# Patient Record
Sex: MALE | Race: WHITE | Employment: STUDENT | URBAN - METROPOLITAN AREA
[De-identification: names, ages, dates, MRNs, and addresses within clinical notes are randomized per-mention and may not be internally consistent; named-entity substitution may affect disease eponyms.]

---

## 2020-04-21 ENCOUNTER — THERAPY VISIT (OUTPATIENT)
Dept: PHYSICAL THERAPY | Facility: CLINIC | Age: 20
End: 2020-04-21
Payer: COMMERCIAL

## 2020-04-21 DIAGNOSIS — Z47.89 UNSPECIFIED ORTHOPEDIC AFTERCARE: ICD-10-CM

## 2020-04-21 DIAGNOSIS — S43.005A SHOULDER DISLOCATION, LEFT, INITIAL ENCOUNTER: ICD-10-CM

## 2020-04-21 PROCEDURE — 97110 THERAPEUTIC EXERCISES: CPT | Mod: GP | Performed by: PHYSICAL THERAPIST

## 2020-04-21 PROCEDURE — 97161 PT EVAL LOW COMPLEX 20 MIN: CPT | Mod: GP | Performed by: PHYSICAL THERAPIST

## 2020-04-21 NOTE — PROGRESS NOTES
[facility] Initial Evaluation  Subjective:  The history is provided by the patient. No  was used.   Therapist Generated HPI Evaluation  Problem details: I started to have a left shoulder subluxation every since 2016.  The last dislocation 10/2019.  I had left shoulder Bankart on 3/16/2020.  continue to wear the sling. No specific complaints of pain   .         Type of problem:  Left shoulder.    This is a new condition.  Condition occurred with:  A fall (before surgery).  Where condition occurred: during recreation/sport (before surgery).  Patient reports pain:  Anterior and lateral.  Pain quality: tightness. and is intermittent.  Pain radiates to:  Shoulder and upper arm. Pain is the same all the time.  Since onset symptoms are gradually improving.  Associated with: none. Exacerbated by: not using the arm now.    Imaging testing: none.  Past treatment: before the other side.   Work activity restrictions: school, from home, computer.  Home/work barriers: house.    Patient Health History  Harry San being seen for left shoulder surgery.     Problem began: 3/16/2020.   Problem occurred: dislocations   Pain is reported as 0/10 on pain scale.  General health as reported by patient is excellent.  Pertinent medical history includes: none.   Red flags:  None as reported by patient.     Surgeries include:  Orthopedic surgery. Other surgery history details: right shoulder surgery--Bankart.    Current medications:  None.    Current occupation is student.   Primary job tasks include:  Computer work and prolonged sitting.                                    Objective:  Standing Alignment:    Cervical/Thoracic:  Normal (good sitting posture)                    Flexibility/Screens:   Positive screens:  Shoulder                             Shoulder Evaluation:  ROM:    PROM:  not assessed                            Pain: AAROM flexion 125, scaption 120    Strength:  not  assessed                      Stability Testing:  not assessed      Special Tests:  not assessed                                           General     ROS    Assessment/Plan:    Patient is a 19 year old male with left side shoulder complaints.    Patient has the following significant findings with corresponding treatment plan.                Diagnosis 1:  Left bankart shoulder surgery 3/16/2020  Decreased ROM/flexibility - manual therapy, therapeutic exercise, therapeutic activity and home program  Impaired muscle performance - neuro re-education and home program  Decreased function - therapeutic activities and home program  Evaluation ongoing with protocol    Therapy Evaluation Codes:   Cumulative Therapy Evaluation is: Low complexity.    Previous and current functional limitations:  (See Goal Flow Sheet for this information)    Short term and Long term goals: (See Goal Flow Sheet for this information)     Communication ability:  Patient appears to be able to clearly communicate and understand verbal and written communication and follow directions correctly.  Treatment Explanation - The following has been discussed with the patient:   RX ordered/plan of care  This patient would benefit from PT intervention to resume normal activities.   Rehab potential is excellent.    Frequency:  1 X week, once daily  Duration:  Every other week for 16 weeks   Discharge Plan:  Achieve all LTG.  Independent in home treatment program.    Please refer to the daily flowsheet for treatment today, total treatment time and time spent performing 1:1 timed codes.

## 2020-04-21 NOTE — LETTER
[facility] PHYSICAL THERAPY  [address]  [place] [address]  [phone]    2020    Re: Harry San   :   2000  MRN:  [id number]   REFERRING PHYSICIAN:   Tim Badillo    [facility] PHYSICAL THERAPY  Date of Initial Evaluation:  2020  Visits:   1  Reason for Referral:     Shoulder dislocation, left, initial encounter  Unspecified orthopedic aftercare    [facility] Initial Evaluation  Subjective:  The history is provided by the patient. No  was used.   Therapist Generated HPI Evaluation  Problem details: I started to have a left shoulder subluxation every since .  The last dislocation 10/2019.  I had left shoulder Bankart on 3/16/2020.  continue to wear the sling. No specific complaints of pain   .         Type of problem:  Left shoulder.  This is a new condition.  Condition occurred with:  A fall (before surgery).  Where condition occurred: during recreation/sport (before surgery).  Patient reports pain:  Anterior and lateral.  Pain quality: tightness. and is intermittent.  Pain radiates to:  Shoulder and upper arm. Pain is the same all the time.  Since onset symptoms are gradually improving.  Associated with: none. Exacerbated by: not using the arm now.    Imaging testing: none.  Past treatment: before the other side.   Work activity restrictions: school, from home, computer.  Home/work barriers: house.    Patient Health History  Harry San being seen for left shoulder surgery.   Problem began: 3/16/2020.   Problem occurred: dislocations   Pain is reported as 0/10 on pain scale.  General health as reported by patient is excellent.  Pertinent medical history includes: none.   Red flags:  None as reported by patient.  Surgeries include:  Orthopedic surgery. Other surgery history details: right shoulder surgery--Bankart.    Current  medications:  None.    Current occupation is student.   Primary job tasks include:  Computer work and prolonged sitting.   Re: Harry San   :   2000             Objective:  Standing Alignment:    Cervical/Thoracic:  Normal (good sitting posture)  Flexibility/Screens:   Positive screens:  Shoulder  Shoulder Evaluation:  ROM:  PROM:  not assessed  Pain: AAROM flexion 125, scaption 120  Strength:  not assessed  Stability Testing:  not assessed  Special Tests:  not assessed    Assessment/Plan:    Patient is a 19 year old male with left side shoulder complaints.    Patient has the following significant findings with corresponding treatment plan.                Diagnosis 1:  Left bankart shoulder surgery 3/16/2020  Decreased ROM/flexibility - manual therapy, therapeutic exercise, therapeutic activity and home program  Impaired muscle performance - neuro re-education and home program  Decreased function - therapeutic activities and home program  Evaluation ongoing with protocol    Therapy Evaluation Codes:   Cumulative Therapy Evaluation is: Low complexity.  Previous and current functional limitations:  (See Goal Flow Sheet for this information)    Short term and Long term goals: (See Goal Flow Sheet for this information)   Communication ability:  Patient appears to be able to clearly communicate and understand verbal and written communication and follow directions correctly.  Treatment Explanation - The following has been discussed with the patient:   RX ordered/plan of care    This patient would benefit from PT intervention to resume normal activities.   Rehab potential is excellent.  Frequency:  1 X week, once daily  Duration:  Every other week for 16 weeks   Discharge Plan:  Achieve all LTG.  Independent in home treatment program.    Thank you for your referral.    INQUIRIES        Therapist:   Parvin Irvin, PT   [facility]  [address]  [place] [address]  Phone: [phone]  Fax: [phone]

## 2020-05-07 ENCOUNTER — VIRTUAL VISIT (OUTPATIENT)
Dept: PHYSICAL THERAPY | Facility: CLINIC | Age: 20
End: 2020-05-07
Payer: COMMERCIAL

## 2020-05-07 DIAGNOSIS — S43.005A SHOULDER DISLOCATION, LEFT, INITIAL ENCOUNTER: ICD-10-CM

## 2020-05-07 DIAGNOSIS — Z47.89 UNSPECIFIED ORTHOPEDIC AFTERCARE: ICD-10-CM

## 2020-05-07 PROCEDURE — 97112 NEUROMUSCULAR REEDUCATION: CPT | Mod: 95 | Performed by: PHYSICAL THERAPIST

## 2020-05-07 PROCEDURE — 97110 THERAPEUTIC EXERCISES: CPT | Mod: 95 | Performed by: PHYSICAL THERAPIST

## 2020-05-07 NOTE — PROGRESS NOTES
"Physical Therapy Virtual Follow Up Visit      The patient has been notified of following:     \"This virtual visit will be conducted between you and your provider. We have found that certain health care needs can be provided without the need for physical presence.  This service lets us provide the care you need with a virtual visit.\"    Due to external, as well as internal [facility] management of the COVID-19 Virus, Harry San was not seen in our clinic.  As a substitution, we implemented a virtual visit to manage this patient's condition utilizing the PTRx virtual visit platform via the patient s existing code.  The provider, Charlene Mark, reviewed the patient's chart, PTRx prescription, and spoke with the patient to determine the following telemedicine visit is appropriate and effective for the patient's care.    The following type of visit was completed:   Video Visit:  The PTRx platform uses a synchronous HIPAA compliant video stream for this patient encounter.        S: Harry San is a 19 year old male. Connected virtually on the PTRx platform to discuss their condition/progress. They noted improvements in motion and function.  They noted ongoing pain or limitations with strength.     Current pain level: 0/10      O: Patient demonstrated L shoulder AROM: flex, abd and IR/ext now normal and ER to 45.  Stressed to not over stretch.  Tolerated advancement to isotonics well. Isometrics are now optional.    PTRx Content from today's visit:  Exercise Name: Pendulum/Codmans, Sets: 1 set - Reps: 5-10 reps, 2-3x/day, Notes: side to side minimal motion and no increase in pain   Exercise Name: Towel Roll Stretch, Sets: 1 - Reps: 1-5 min, Notes: Towel can go lengthwise or crosswise and keep arms in a comfortable position.  Focus on deep diaphragmatic breathing.   Or lie on your back over your exercise ball.  Exercise Name: Isometric Shoulder External Rotation, Sets: 1 - Reps: 10 hold 5 sec , Notes: 3x/ " "week  Exercise Name: Isometric Shoulder Flexion, Sets: 1 - Reps: 10 x 5 sec hold  - Sessions:  3x /week  Exercise Name: Isometric Shoulder Extension, Sets: 1  - Reps: 10 x 5 sec  - Sessions: 3x/ week  Exercise Name: Isometric Shoulder Abduction , Sets: 1 - Reps: 10 x 5 sec,  - Sessions: 3x week  Exercise Name: Scapular Retraction/Depression, Notes: throughout the day and with shoulder blade exercises  Exercise Name: Ball Prone Scapula I, Sets: 1 - Reps: x10-30, Notes: PALMS DOWN.  Raise arms to trunk level and then lower.  3x/week.  Once 30 reps is easy, then add 2-4 oz.  Max wt 2-3 lbs.    Exercise Name: Ball Prone Scapula T (Thumbs Up), Sets: 1 - Reps: x10-30, Notes: Shoulder blades down/together and head and body in neutral alignment.  THUMBS UP and raise arms up to \"t\" position..  Painfree range of motion.   Raise arms to trunk level and then lower.  3x/week.  Once 30 reps is easy, then add 2-4 oz.  Max wt 2-3 lbs.    Exercise Name: Supine Ceiling Punch, Sets: 1 set - Reps: x10-30 reps - Sessions: 1, Notes: Thumb up.  Gently round shoulder blades around back/ribs and slowly lower back down.  When 30 reps is easy, add 2 oz and decrease reps to 10.  Then gradually return to 30 reps and move up to 4 oz.  Max weight=1 lb.  Exercise Name: Shoulder Flexion, Sets: 1 - Reps: x10-30, Notes: Pain free range and reps.  3 x/week.  Once 30 reps is easy, add 2-4 oz.  Max weight=1-2 lbs.    Exercise Name: Shoulder Scaption Full Can, Sets: 1 - Reps: x10-30 - Sessions: 1, Notes: Pain free range.  3 x/week.  Once 30 reps is easy, add 2-4 oz.  Max weight=1-2 lbs.    Exercise Name: Supscapularis Wing, Sets: 1 - Reps: x10-30, Notes: 3x/week.  Do both arms at the same time.  Exercise Name: Shoulder External Rotation Sidelying, Sets: 1 - Reps: x10-30 - Sessions: once, 3x/week, Notes: Pain free range and don't push through fatigue.  3 x/week.  Once 30 reps is easy, add 2-4 oz.  Max weight=1-2 lbs.    A:   Patient's symptoms are " resolving.  Response to therapy has shown an improvement in  pain level, flexibility and strength      P: Patient will continue with the exercise program assigned on their PTRx code and will add the following measures to manage their pain/condition: ice prn     Current treatment program is being advanced to more complex exercises.      Virtual visit contact time    Time of service began: 1:00 PM  Time of service ended: 1:20 PM  Total Time for set up, visit, and documentation: 30 minutes    Payor: BCBS / Plan: BCBS OUT OF STATE / Product Type: Indemnity /   .  Procedure Code/s   Therapeutic Exercise (99951): 10 minutes  Neuromuscular Re-education (87735): 10 minutes    I have reviewed the note as documented above.  This accurately captures the substance of my conversation with the patient.  Provider location: [facility] ([facility]State)  Patient location: [facility]

## 2020-05-19 ENCOUNTER — VIRTUAL VISIT (OUTPATIENT)
Dept: PHYSICAL THERAPY | Facility: CLINIC | Age: 20
End: 2020-05-19
Payer: COMMERCIAL

## 2020-05-19 DIAGNOSIS — Z47.89 UNSPECIFIED ORTHOPEDIC AFTERCARE: ICD-10-CM

## 2020-05-19 DIAGNOSIS — S43.005A SHOULDER DISLOCATION, LEFT, INITIAL ENCOUNTER: ICD-10-CM

## 2020-05-19 PROCEDURE — 97112 NEUROMUSCULAR REEDUCATION: CPT | Mod: 95 | Performed by: PHYSICAL THERAPIST

## 2020-05-19 PROCEDURE — 97110 THERAPEUTIC EXERCISES: CPT | Mod: 95 | Performed by: PHYSICAL THERAPIST

## 2020-05-19 NOTE — PROGRESS NOTES
"Physical Therapy Virtual Follow Up Visit      The patient has been notified of following:     \"This virtual visit will be conducted between you and your provider. We have found that certain health care needs can be provided without the need for physical presence.  This service lets us provide the care you need with a virtual visit.\"    Due to external, as well as internal [facility] management of the COVID-19 Virus, Harry San was not seen in our clinic.  As a substitution, we implemented a virtual visit to manage this patient's condition utilizing the PTRx virtual visit platform via the patient s existing code.  The provider, Charlene Mark, reviewed the patient's chart, PTRx prescription, and spoke with the patient to determine the following telemedicine visit is appropriate and effective for the patient's care.    The following type of visit was completed:   Video Visit:  The PTRx platform uses a synchronous HIPAA compliant video stream for this patient encounter.        S: Harry San is a 19 year old male. Connected virtually on the PTRx platform to discuss their condition/progress. They noted improvements in motion, strength and function.  They noted ongoing pain or limitations with time to do his HEP.     Current pain level: 0/10  Working a lot so has not been compliant with HEP.      O: Patient demonstrated near normal L shoulder AROM.  Discussion on compliance and working HEP into his day.     PTRx Content from today's visit:  Exercise Name: Scapular Retraction/Depression, Notes: throughout the day and with shoulder blade exercises  Exercise Name: Ball Prone Scapula I, Sets: 1 - Reps: x10-30, Notes: PALMS DOWN.  Raise arms to trunk level and then lower.  3x/week.  Once 30 reps is easy, then add 2-4 oz.  Max wt 2-3 lbs.    Exercise Name: Shoulder Extension in Standing, Sets: 1 set - Reps: 10-30 reps - Sessions: 1, Notes: 3x/week.  Good squat position;  Back straight, shoulder blades down and " "together, and raise just to the level of your trunk.  Ok to do both arms at the same time.  Exercise Name: Ball Prone Scapula T (Thumbs Up), Sets: 1 - Reps: x10-30, Notes: Shoulder blades down/together and head and body in neutral alignment.  THUMBS UP and raise arms up to \"t\" position..  Painfree range of motion.   Raise arms to trunk level and then lower.  3x/week.  Once 30 reps is easy, then add 2-4 oz.  Max wt 2-3 lbs.    Exercise Name: Shoulder Horizontal Abduction Standing, Sets: 1 - Reps: x10-30, Notes: 3x/week.  Good squat position; Back straight, shoulder blades down and together, and raise arm slightly out to the side.  Ok to do both arms at the same time. Pain free range. When 30 reps is easy, then add 2-4 oz.  Drop back down to 10, progress to 30, then increase the weight.  Max wt=1-2 lbs.  Exercise Name: Push-Up Plus At Counter, Sets: 1 - Reps: x10-30, Notes: Keep elbows close to body and don't lower body to touching counter; stop when arms are in line with trunk.  When arms are straight, round shoulder blades around trunk/ribs.  3x/week.  Exercise Name: Shoulder Flexion, Sets: 1 - Reps: x10-30, Notes: Pain free range and reps.  3 x/week.  Once 30 reps is easy, add 2-4 oz.  Max weight=1-2 lbs.    Exercise Name: Shoulder Scaption Full Can, Sets: 1 - Reps: x10-30 - Sessions: 1, Notes: Pain free range.  3 x/week.  Once 30 reps is easy, add 2-4 oz.  Max weight=1-2 lbs.    Exercise Name: Supscapularis Wing, Sets: 1 - Reps: x10-30, Notes: 3x/week.  Do both arms at the same time.  Exercise Name: Subscapularis Lift Off, Sets: 1 - Reps: 5 seconds x10, Notes: 3x/week.  Ok to do both arms at the same time.  Exercise Name: Shoulder External Rotation Sidelying, Sets: 1 - Reps: x10-30 - Sessions: once, 3x/week, Notes: Pain free range and don't push through fatigue.  3 x/week.  Once 30 reps is easy, add 2-4 oz.  Max weight=1-2 lbs.    A:   Patient's symptoms are resolving.  Response to therapy has shown an improvement " in  pain level, flexibility, strength and function      P: Patient will continue with the exercise program assigned on their PTRx code and will add the following measures to manage their pain/condition: ice prn.  F/U in PT in 2 weeks.     Current treatment program is being advanced to more complex exercises.      Virtual visit contact time    Time of service began: 11:00 AM  Time of service ended: 11:18 AM  Total Time for set up, visit, and documentation: 30 minutes    Payor: BCBS / Plan: BCBS OUT OF STATE / Product Type: Indemnity /   .  Procedure Code/s   Therapeutic Exercise (40447): 10 minutes  Neuromuscular Re-education (68219): 8 minutes    I have reviewed the note as documented above.  This accurately captures the substance of my conversation with the patient.  Provider location: [facility] ([facility]/State)  Patient location: [facility]

## 2020-06-02 ENCOUNTER — VIRTUAL VISIT (OUTPATIENT)
Dept: PHYSICAL THERAPY | Facility: CLINIC | Age: 20
End: 2020-06-02
Payer: COMMERCIAL

## 2020-06-02 DIAGNOSIS — S43.005A SHOULDER DISLOCATION, LEFT, INITIAL ENCOUNTER: ICD-10-CM

## 2020-06-02 DIAGNOSIS — Z47.89 UNSPECIFIED ORTHOPEDIC AFTERCARE: ICD-10-CM

## 2020-06-02 PROCEDURE — 97112 NEUROMUSCULAR REEDUCATION: CPT | Mod: 95 | Performed by: PHYSICAL THERAPIST

## 2020-06-02 PROCEDURE — 97110 THERAPEUTIC EXERCISES: CPT | Mod: 95 | Performed by: PHYSICAL THERAPIST

## 2020-06-17 ENCOUNTER — THERAPY VISIT (OUTPATIENT)
Dept: PHYSICAL THERAPY | Facility: CLINIC | Age: 20
End: 2020-06-17
Payer: COMMERCIAL

## 2020-06-17 DIAGNOSIS — Z47.89 UNSPECIFIED ORTHOPEDIC AFTERCARE: ICD-10-CM

## 2020-06-17 DIAGNOSIS — S43.005A SHOULDER DISLOCATION, LEFT, INITIAL ENCOUNTER: ICD-10-CM

## 2020-06-17 PROCEDURE — 97112 NEUROMUSCULAR REEDUCATION: CPT | Mod: GP | Performed by: PHYSICAL THERAPIST

## 2020-06-17 PROCEDURE — 97110 THERAPEUTIC EXERCISES: CPT | Mod: GP | Performed by: PHYSICAL THERAPIST

## 2020-07-15 ENCOUNTER — THERAPY VISIT (OUTPATIENT)
Dept: PHYSICAL THERAPY | Facility: CLINIC | Age: 20
End: 2020-07-15
Payer: COMMERCIAL

## 2020-07-15 DIAGNOSIS — S43.005A SHOULDER DISLOCATION, LEFT, INITIAL ENCOUNTER: ICD-10-CM

## 2020-07-15 DIAGNOSIS — Z47.89 UNSPECIFIED ORTHOPEDIC AFTERCARE: ICD-10-CM

## 2020-07-15 PROCEDURE — 97112 NEUROMUSCULAR REEDUCATION: CPT | Mod: GP | Performed by: PHYSICAL THERAPIST

## 2020-07-15 PROCEDURE — 97110 THERAPEUTIC EXERCISES: CPT | Mod: GP | Performed by: PHYSICAL THERAPIST

## 2020-07-15 NOTE — PROGRESS NOTES
Subjective:  HPI  Physical Exam                    Objective:  System    Physical Exam    General     ROS    Assessment/Plan:    PROGRESS  REPORT    Progress reporting period is from 4/21/20 to 7/15/20.       SUBJECTIVE  Subjective changes noted by patient:  Harry reports is shoulder is functioning normally with ADLs and work.  Has not attempted weight lifting.  Current pain level is 0/10  .     Changes in function:  Yes (See Goal flowsheet attached for changes in current functional level)  Adverse reaction to treatment or activity: None    OBJECTIVE  -L shoulder AROM: flex 158, abd 168, ER 55, IR/ext T5, ER/abd T5; IR/ER in 90 abd: 55-0-68 (R 54-0-97)  -MMT all 5/5 and pain free  -HEP focusing on RC isotonics and scap stab.  We have discussed safe and modified wt lifting        ASSESSMENT/PLAN  Updated problem list and treatment plan: Diagnosis 1:  L Bankart  Pain -  hot/cold therapy and home program  Decreased ROM/flexibility - manual therapy and therapeutic exercise  Decreased strength - therapeutic exercise and therapeutic activities  Decreased proprioception - neuro re-education and therapeutic activities  Inflammation - cold therapy and self management/home program  Impaired muscle performance - neuro re-education  Decreased function - therapeutic activities  Impaired posture - neuro re-education  STG/LTGs have been met or progress has been made towards goals:  Yes (See Goal flow sheet completed today.)  Assessment of Progress: The patient's condition is improving.  Self Management Plans:  Patient is independent in a home treatment program.  Patient is independent in self management of symptoms.  I have re-evaluated this patient and find that the nature, scope, duration and intensity of the therapy is appropriate for the medical condition of the patient.  Harry continues to require the following intervention to meet STG and LTG's:  PT intervention is no longer required to meet STG/LTG.    Recommendations:  Pt  is ready to be independent with with HEP.  If problems arise, he will return.      Please refer to the daily flowsheet for treatment today, total treatment time and time spent performing 1:1 timed codes.

## 2020-08-26 PROBLEM — S43.005A SHOULDER DISLOCATION, LEFT, INITIAL ENCOUNTER: Status: RESOLVED | Noted: 2020-04-21 | Resolved: 2020-08-26

## 2020-08-26 PROBLEM — Z47.89 UNSPECIFIED ORTHOPEDIC AFTERCARE: Status: RESOLVED | Noted: 2020-04-21 | Resolved: 2020-08-26

## 2024-02-12 NOTE — PROGRESS NOTES
"Physical Therapy Virtual Follow Up Visit      The patient has been notified of following:     \"This virtual visit will be conducted between you and your provider. We have found that certain health care needs can be provided without the need for physical presence.  This service lets us provide the care you need with a virtual visit.\"    Due to external, as well as internal [facility] management of the COVID-19 Virus, Harry San was not seen in our clinic.  As a substitution, we implemented a virtual visit to manage this patient's condition utilizing the PTRx virtual visit platform via the patient s existing code.  The provider, Charlene Mark, reviewed the patient's chart, PTRx prescription, and spoke with the patient to determine the following telemedicine visit is appropriate and effective for the patient's care.    The following type of visit was completed:   Video Visit:  The PTRx platform uses a synchronous HIPAA compliant video stream for this patient encounter.        S: Harry San is a 19 year old male. Connected virtually on the PTRx platform to discuss their condition/progress. They noted improvements in strength and funtion.  They noted ongoing pain or limitations with heavy lifting.     Current pain level: 0/10  Steady progress reported.  Has been consistent with HEP and using 6-12 oz with RC and scap stab exercises.    O: Patient demonstrated near full AROM.  Initiated light general UE wt lifting today and ok'd jogging. Reviewed RC isotonics and scap stab exercises.    PTRx Content from today's visit:  Exercise Name: Pec Stretch Doorway - Reps: 30 seconds x 2-3, Notes: Do deep breathing.  Keep weight on feet.  Ok to vary arm angle to 60 deg, 90 deg, and/or 120 deg.  Stretch is ok, pain is not.  Exercise Name: Towel Roll Stretch, Sets: 1 - Reps: 1-5 min, Notes: Towel can go lengthwise or crosswise and keep arms in a comfortable position.  Focus on deep diaphragmatic breathing.   Or lie on " [Normal Development] : Normal Development your back over your exercise ball.  Exercise Name: Elbow Strengthening Isotonic Flexion, Sets: 1-3 - Reps: x10-15 - Sessions: 1, Notes: then can increase weight, ideally 1 lb at a time  Exercise Name: Bent Over Rows, Sets: 3 - Reps: x10-15, Notes: Can hold shoulder blade down and back the entire time or let shoulder blade round off the back when arm is down .  Then can increase weight, ideally 1 lb at a time.  Bring arm up to trunk level only. Can also do both arms at the same time in an arabesque.  Exercise Name: Supine Elbow Extension, Sets: 1-3 - Reps: x10-15 - Sessions: 1, Notes: Then can increase weight, ideally 1 lb at a time.  Can also lie on your back on the exercise ball (bridge position).    A:   Patient's symptoms are resolving.  Response to therapy has shown an improvement in  pain level, flexibility, strength and function      P: Patient will continue with the exercise program assigned on their PTRx code and will add the following measures to manage their pain/condition: ice and rest.  We have max'd out on virtual faisal benefits so PT is recommending in clinic apps in the future.    Current treatment program is being advanced to more complex exercises.      Virtual visit contact time    Time of service began: 9:38 AM  Time of service ended: 10:00 AM  Total Time for set up, visit, and documentation: 35 minutes    Payor: BCBS / Plan: BCBS OUT OF STATE / Product Type: Indemnity /   .  Procedure Code/s   Therapeutic Exercise (00753): 12 minutes  Neuromuscular Re-education (78470): 10 minutes    I have reviewed the note as documented above.  This accurately captures the substance of my conversation with the patient.  Provider location: [facility] ([facility])  Patient location: [facility]               [Smiles responsively] : smiles responsively [Vocalizes with simple cooing] : vocalizes with simple cooing [Lifts head and chest in prone] : lifts head and chest in prone [Opens and shuts hands] : opens and shuts hands